# Patient Record
Sex: FEMALE | Race: BLACK OR AFRICAN AMERICAN
[De-identification: names, ages, dates, MRNs, and addresses within clinical notes are randomized per-mention and may not be internally consistent; named-entity substitution may affect disease eponyms.]

---

## 2019-10-22 ENCOUNTER — HOSPITAL ENCOUNTER (EMERGENCY)
Dept: HOSPITAL 72 - EMR | Age: 34
Discharge: HOME | End: 2019-10-22
Payer: COMMERCIAL

## 2019-10-22 VITALS — SYSTOLIC BLOOD PRESSURE: 117 MMHG | DIASTOLIC BLOOD PRESSURE: 72 MMHG

## 2019-10-22 VITALS — WEIGHT: 155 LBS | HEIGHT: 67 IN | BODY MASS INDEX: 24.33 KG/M2

## 2019-10-22 DIAGNOSIS — E07.9: ICD-10-CM

## 2019-10-22 DIAGNOSIS — L03.113: Primary | ICD-10-CM

## 2019-10-22 PROCEDURE — 99282 EMERGENCY DEPT VISIT SF MDM: CPT

## 2019-10-22 NOTE — NUR
ED Nurse Note:



Pt walked in due to right arm pain and redness x 1 day. Also noted redness on 
right foot. No SOB. VSS.

## 2019-10-22 NOTE — NUR
ED Nurse Note:



Pt cleared by ERMD for discharge.  DC instructions/prescription was given and 
explained to pt and verbalized understanding of teachings. All medical deviecs 
such as ID band  removed. Pt is AAO x4, ambulatory and left with all personal 
belongings.

## 2019-11-17 ENCOUNTER — HOSPITAL ENCOUNTER (EMERGENCY)
Dept: HOSPITAL 72 - EMR | Age: 34
Discharge: ANOTHER HOSPITAL | End: 2019-11-17
Payer: MEDICAID

## 2019-11-17 VITALS — DIASTOLIC BLOOD PRESSURE: 72 MMHG | SYSTOLIC BLOOD PRESSURE: 116 MMHG

## 2019-11-17 VITALS — DIASTOLIC BLOOD PRESSURE: 68 MMHG | SYSTOLIC BLOOD PRESSURE: 137 MMHG

## 2019-11-17 VITALS — SYSTOLIC BLOOD PRESSURE: 140 MMHG | DIASTOLIC BLOOD PRESSURE: 78 MMHG

## 2019-11-17 VITALS — DIASTOLIC BLOOD PRESSURE: 66 MMHG | SYSTOLIC BLOOD PRESSURE: 118 MMHG

## 2019-11-17 VITALS — DIASTOLIC BLOOD PRESSURE: 68 MMHG | SYSTOLIC BLOOD PRESSURE: 126 MMHG

## 2019-11-17 VITALS — HEIGHT: 67 IN | BODY MASS INDEX: 29.19 KG/M2 | WEIGHT: 186 LBS

## 2019-11-17 VITALS — SYSTOLIC BLOOD PRESSURE: 116 MMHG | DIASTOLIC BLOOD PRESSURE: 68 MMHG

## 2019-11-17 DIAGNOSIS — R10.11: Primary | ICD-10-CM

## 2019-11-17 DIAGNOSIS — K80.20: ICD-10-CM

## 2019-11-17 DIAGNOSIS — Z97.5: ICD-10-CM

## 2019-11-17 DIAGNOSIS — N30.00: ICD-10-CM

## 2019-11-17 LAB
ADD MANUAL DIFF: NO
ALBUMIN SERPL-MCNC: 4.2 G/DL (ref 3.4–5)
ALBUMIN/GLOB SERPL: 1.1 {RATIO} (ref 1–2.7)
ALP SERPL-CCNC: 113 U/L (ref 46–116)
ALT SERPL-CCNC: 69 U/L (ref 12–78)
ANION GAP SERPL CALC-SCNC: 14 MMOL/L (ref 5–15)
APPEARANCE UR: (no result)
APTT BLD: 26 SEC (ref 23–33)
APTT PPP: (no result) S
AST SERPL-CCNC: 100 U/L (ref 15–37)
BASOPHILS NFR BLD AUTO: 0.5 % (ref 0–2)
BILIRUB SERPL-MCNC: 0.7 MG/DL (ref 0.2–1)
BUN SERPL-MCNC: 5 MG/DL (ref 7–18)
CALCIUM SERPL-MCNC: 9.1 MG/DL (ref 8.5–10.1)
CHLORIDE SERPL-SCNC: 106 MMOL/L (ref 98–107)
CO2 SERPL-SCNC: 23 MMOL/L (ref 21–32)
CREAT SERPL-MCNC: 0.8 MG/DL (ref 0.55–1.3)
EOSINOPHIL NFR BLD AUTO: 0.7 % (ref 0–3)
ERYTHROCYTE [DISTWIDTH] IN BLOOD BY AUTOMATED COUNT: 10.2 % (ref 11.6–14.8)
GLOBULIN SER-MCNC: 3.9 G/DL
GLUCOSE UR STRIP-MCNC: (no result) MG/DL
HCT VFR BLD CALC: 45.7 % (ref 37–47)
HGB BLD-MCNC: 15.4 G/DL (ref 12–16)
INR PPP: 0.9 (ref 0.9–1.1)
KETONES UR QL STRIP: (no result)
LEUKOCYTE ESTERASE UR QL STRIP: (no result)
LYMPHOCYTES NFR BLD AUTO: 23.8 % (ref 20–45)
MCV RBC AUTO: 84 FL (ref 80–99)
MONOCYTES NFR BLD AUTO: 8.3 % (ref 1–10)
NEUTROPHILS NFR BLD AUTO: 66.8 % (ref 45–75)
NITRITE UR QL STRIP: NEGATIVE
PH UR STRIP: 8 [PH] (ref 4.5–8)
PLATELET # BLD: 380 K/UL (ref 150–450)
POTASSIUM SERPL-SCNC: 3.4 MMOL/L (ref 3.5–5.1)
PROT UR QL STRIP: NEGATIVE
RBC # BLD AUTO: 5.41 M/UL (ref 4.2–5.4)
SODIUM SERPL-SCNC: 143 MMOL/L (ref 136–145)
SP GR UR STRIP: 1.01 (ref 1–1.03)
UROBILINOGEN UR-MCNC: NORMAL MG/DL (ref 0–1)
WBC # BLD AUTO: 14.1 K/UL (ref 4.8–10.8)

## 2019-11-17 PROCEDURE — 96367 TX/PROPH/DG ADDL SEQ IV INF: CPT

## 2019-11-17 PROCEDURE — 84703 CHORIONIC GONADOTROPIN ASSAY: CPT

## 2019-11-17 PROCEDURE — 83690 ASSAY OF LIPASE: CPT

## 2019-11-17 PROCEDURE — 74177 CT ABD & PELVIS W/CONTRAST: CPT

## 2019-11-17 PROCEDURE — 81003 URINALYSIS AUTO W/O SCOPE: CPT

## 2019-11-17 PROCEDURE — 99285 EMERGENCY DEPT VISIT HI MDM: CPT

## 2019-11-17 PROCEDURE — 80307 DRUG TEST PRSMV CHEM ANLYZR: CPT

## 2019-11-17 PROCEDURE — 96376 TX/PRO/DX INJ SAME DRUG ADON: CPT

## 2019-11-17 PROCEDURE — 80053 COMPREHEN METABOLIC PANEL: CPT

## 2019-11-17 PROCEDURE — 96365 THER/PROPH/DIAG IV INF INIT: CPT

## 2019-11-17 PROCEDURE — 86901 BLOOD TYPING SEROLOGIC RH(D): CPT

## 2019-11-17 PROCEDURE — 85610 PROTHROMBIN TIME: CPT

## 2019-11-17 PROCEDURE — 76700 US EXAM ABDOM COMPLETE: CPT

## 2019-11-17 PROCEDURE — 96375 TX/PRO/DX INJ NEW DRUG ADDON: CPT

## 2019-11-17 PROCEDURE — 93005 ELECTROCARDIOGRAM TRACING: CPT

## 2019-11-17 PROCEDURE — 85730 THROMBOPLASTIN TIME PARTIAL: CPT

## 2019-11-17 PROCEDURE — 85025 COMPLETE CBC W/AUTO DIFF WBC: CPT

## 2019-11-17 PROCEDURE — 36415 COLL VENOUS BLD VENIPUNCTURE: CPT

## 2019-11-17 PROCEDURE — 86850 RBC ANTIBODY SCREEN: CPT

## 2019-11-17 PROCEDURE — 96361 HYDRATE IV INFUSION ADD-ON: CPT

## 2019-11-17 PROCEDURE — 86900 BLOOD TYPING SEROLOGIC ABO: CPT

## 2019-11-17 NOTE — DIAGNOSTIC IMAGING REPORT
Indication: Abdominal pain, abnormal liver function tests

 

Technique: Gray-scale and duplex images of the upper abdomen were obtained

 

Comparison: CT scan of earlier the same day

 

Findings: Gallbladder demonstrates gallstones. No gallbladder wall thickening nor

pericholecystic fluid.  However, sonographic Monaco's sign is positive.  Common bile

duct measures 2 mm in diameter.  No intrahepatic biliary ductal dilatation.  Liver

demonstrates normal echogenicity, no focal abnormality.   Portal vein and hepatic

veins are patent.   Pancreas is unremarkable.    Spleen is unremarkable.  Left kidney

measures 12.5 cm in length.  Right kidney measures 12.5 cm length.  Both kidneys

demonstrate normal echogenicity.  There is no hydronephrosis.   No focal abnormality

. Non-aneurysmal abdominal aorta .

 

Impression: Cholelithiasis. Positive sonographic Monaco's sign raises concern for

acute cholecystitis. Correlate with clinical findings, consider hepatobiliary nuclear

scan if there is high clinical suspicion

 

Negative for dilated bile ducts

 

No other significant abnormality

 

This agrees with the preliminary interpretation provided overnight by [facility]

teleradiology service.

## 2019-11-17 NOTE — EMERGENCY ROOM REPORT
History of Present Illness


General


Chief Complaint:  Abdominal Pain


Source:  Patient


 (Lew Garcia MD)





Present Illness


HPI


Patient presents with severe abdominal pain.  She feels it diffusely in her 

abdomen.  Started yesterday.  She has had problems with gallbladder pain in the 

past but this feels different.  She has been vomiting uncontrollably also.  

There is no coffee grounds.  When she vomits it tastes bitter like bile she had 

a normal bowel movement earlier today.  No blood, melena or constipation.  She 

denies any dysuria at this time.  She is broken out in a sweat.





In July she was evaluated for abdominal pain.  Abdominal CT was performed which 

revealed gallstones.  Her white count was normal at that time.  This is the 

medical decision making:


Patient presents with right-sided abdominal pain.  This probably secondary to 

cholelithiasis and biliary colic.  No evidence of cholecystitis or obstruction.

  Pain is well controlled now.  Her elevated liver enzymes probably secondary 

to inflammatory process that has now resolved.  No evidence of any obstruction.

  Patient does have a urinary tract infection.  Antibiotics given here.  No 

evidence of an acute abdomen.  Will discharge home.





No fevers, chills, sore throat, chest pain, palpitations, shortness of breath, 

joint pain, rashes, depression, anxiety, visual changes, dizziness, headache.





She was evaluated here for a goiter.  She was deemed to be euthyroid.


 (Lew Garcia MD)


Allergies:  


Coded Allergies:  


     No Known Allergies (Unverified , 5/17/15)





Patient History


Past Medical History:  see triage record


Social History:  Reports: drug use - [facility]


Social History Narrative


With significant other


Last Menstrual Period:  10/2019


Pregnant Now:  No


Reviewed Nursing Documentation:  PMH: Agreed; PSxH: Agreed (Lew Garcia MD)





Nursing Documentation-PMH


Past Medical History:  No History, Except For


 (Lew Garcia MD)





Physical Exam





Vital Signs








  Date Time  Temp Pulse Resp B/P (MAP) Pulse Ox O2 Delivery O2 Flow Rate FiO2


 


11/17/19 11:45 98.2 55 20 129/72 (91) 98 Room Air  








Sp02 EP Interpretation:  reviewed, normal


General Appearance:  well appearing, GCS 15, non-toxic, moderate distress


Head:  normocephalic, atraumatic


Eyes:  bilateral eye normal inspection, bilateral eye PERRL, bilateral eye EOMI


ENT:  moist mucus membranes


Neck:  full range of motion, thyromegaly


Respiratory:  lungs clear, normal breath sounds


Cardiovascular #1:  regular rate, rhythm


Cardiovascular #2:  2+ radial (R)


Gastrointestinal:  normal inspection, normal bowel sounds, no mass, non-

distended, no rebound, guarding - diffuse, tenderness


Genitourinary:  no CVA tenderness


Musculoskeletal:  back normal, normal range of motion


Neurologic:  alert, oriented x3, grossly normal


Psychiatric:  anxious - and in pain


Skin:  diaphoresis, warm/dry


 (Lew Garcia MD)





Medical Decision Making


Diagnostic Impression:  


 Primary Impression:  


 Abdominal pain


 Qualified Codes:  R10.11 - Right upper quadrant pain


 Additional Impressions:  


 UTI (urinary tract infection)


 Qualified Codes:  N30.00 - Acute cystitis without hematuria


 Symptomatic cholelithiasis


ER Course


The patient presents with severe abdominal pain that began last night.  

Differential includes gastritis, gastroenteritis, diverticulitis, pancreatitis, 

appendicitis, urinary tract infection, pyelonephritis amongst others.  The 

patient is an extremis and pain at this time.  Evaluation will be with labs and 

CT of the abdomen, EKG.  Patient will be treated with IV hydration and 

analgesia and anti-medics with some Pepcid.





After first dose of morphine pain still significant.  Dilaudid given.





Improvement after Dilaudid able to rest.





Initial EKG suggests atrial fibrillation however I read more atrial bigeminy.  

Labs with elevated white count.  Elevated AST.  Slightly low potassium.





EKG repeated.  Normal sinus rhythm with nonspecific ST-T wave changes.





Ultrasound ordered.





Flagyl added.  Concern for cholecystitis.





Pain returns.  Also nausea.  Zofran given and repeat Dilaudid.  CT pending.  

Patient signed out to Dr. Vasquez





Laboratory Tests








Test


  11/17/19


11:55 11/17/19


15:10


 


White Blood Count


  14.1 K/UL


(4.8-10.8)  H 


 


 


Red Blood Count


  5.41 M/UL


(4.20-5.40)  H 


 


 


Hemoglobin


  15.4 G/DL


(12.0-16.0) 


 


 


Hematocrit


  45.7 %


(37.0-47.0) 


 


 


Mean Corpuscular Volume 84 FL (80-99)   


 


Mean Corpuscular Hemoglobin


  28.5 PG


(27.0-31.0) 


 


 


Mean Corpuscular Hemoglobin


Concent 33.8 G/DL


(32.0-36.0) 


 


 


Red Cell Distribution Width


  10.2 %


(11.6-14.8)  L 


 


 


Platelet Count


  380 K/UL


(150-450) 


 


 


Mean Platelet Volume


  5.3 FL


(6.5-10.1)  L 


 


 


Neutrophils (%) (Auto)


  66.8 %


(45.0-75.0) 


 


 


Lymphocytes (%) (Auto)


  23.8 %


(20.0-45.0) 


 


 


Monocytes (%) (Auto)


  8.3 %


(1.0-10.0) 


 


 


Eosinophils (%) (Auto)


  0.7 %


(0.0-3.0) 


 


 


Basophils (%) (Auto)


  0.5 %


(0.0-2.0) 


 


 


Prothrombin Time


  10.1 SEC


(9.30-11.50) 


 


 


Prothrombin Time INR 0.9 (0.9-1.1)   


 


PTT


  26 SEC (23-33)


  


 


 


Sodium Level


  143 MMOL/L


(136-145) 


 


 


Potassium Level


  3.4 MMOL/L


(3.5-5.1)  L 


 


 


Chloride Level


  106 MMOL/L


() 


 


 


Carbon Dioxide Level


  23 MMOL/L


(21-32) 


 


 


Anion Gap


  14 mmol/L


(5-15) 


 


 


Blood Urea Nitrogen


  5 mg/dL (7-18)


L 


 


 


Creatinine


  0.8 MG/DL


(0.55-1.30) 


 


 


Estimate Glomerular


Filtration Rate > 60 mL/min


(>60) 


 


 


Glucose Level


  146 MG/DL


()  H 


 


 


Calcium Level


  9.1 MG/DL


(8.5-10.1) 


 


 


Total Bilirubin


  0.7 MG/DL


(0.2-1.0) 


 


 


Aspartate Amino Transferase


(AST) 100 U/L


(15-37)  H 


 


 


Alanine Aminotransferase (ALT)


  69 U/L (12-78)


  


 


 


Alkaline Phosphatase


  113 U/L


() 


 


 


Total Protein


  8.1 G/DL


(6.4-8.2) 


 


 


Albumin


  4.2 G/DL


(3.4-5.0) 


 


 


Globulin 3.9 g/dL   


 


Albumin/Globulin Ratio 1.1 (1.0-2.7)   


 


Lipase


  141 U/L


() 


 


 


Human Chorionic Gonadotropin,


Qual Negative


(NEGATIVE) 


 


 


Serum Alcohol < 3 mg/dL   


 


Urine Color  Pale yellow  


 


Urine Appearance  Cloudy  


 


Urine pH  8 (4.5-8.0)  


 


Urine Specific Gravity


  


  1.015


(1.005-1.035)


 


Urine Protein


  


  Negative


(NEGATIVE)


 


Urine Glucose (UA)


  


  1+ (NEGATIVE)


H


 


Urine Ketones


  


  3+ (NEGATIVE)


H


 


Urine Blood


  


  2+ (NEGATIVE)


H


 


Urine Nitrite


  


  Negative


(NEGATIVE)


 


Urine Bilirubin


  


  Negative


(NEGATIVE)


 


Urine Urobilinogen


  


  Normal MG/DL


(0.0-1.0)


 


Urine Leukocyte Esterase


  


  1+ (NEGATIVE)


H


 


Urine RBC


  


  2-4 /HPF (0 -


2)  H


 


Urine WBC


  


  5-10 /HPF (0 -


2)  H


 


Urine Squamous Epithelial


Cells 


  Moderate /LPF


(NONE/OCC)  H


 


Urine Amorphous Sediment


  


  Moderate /LPF


(NONE)  H


 


Urine Bacteria


  


  Few /HPF


(NONE)


 


Urine Opiates Screen  Pending  


 


Urine Barbiturates Screen  Pending  


 


Phencyclidine (PCP) Screen  Pending  


 


Urine Amphetamines Screen  Pending  


 


Urine Benzodiazepines Screen  Pending  


 


Urine Cocaine Screen  Pending  


 


Urine Marijuana (THC) Screen  Pending  








 (Lew Garcia MD)





EKG Diagnostic Results


Rate:  other


Rhythm:  other - Indeterminate rhythm possible atrial fibrillation versus 

atrial bigeminy


ST Segments:  no acute changes


 (Lew Garcia MD)





Rhythm Strip Diag. Results


EP Interpretation:  yes


Rhythm:  NSR, no PVC's, other - PACs


 (Lew Garcia MD)





CT/MRI/US Diagnostic Results


CT/MRI/US Diagnostic Results :  


   Impression


Preliminary Findings Only  See Final Report For Complete Findings


CT ABDOMEN & PELVIS With Contrast:





Comparison is made to CT abdomen/pelvis on 7/25/2019.





Cholelithiasis.





Small splenule.





Excreting contrast in the renal collecting systems. No hydronephrosis.





Normal appendix.





Prominence of the walls of the colon may be secondary to under distention 

versus colitis.





No small bowel obstruction.





Intrauterine device in appropriate position.





Mild prominence of the bladder wall may be secondary to underdistention. Please 

correlate with urinalysis if concerned for cystitis.





Collapsing cyst or corpus luteum in the left ovary.








Radiologist:   Stephen Lucero M.D.   Phone:   [phone]


Study ready at 15:25 and initial results transmitted at 16:26


*This report constitutes a preliminary interpretation only. Non-acute findings 

felt to be unrelated to the clinical presentation may not be discussed in this 

report. The study will be interpreted and a final report will be generated by 

the local Radiologist the following shift. To reach the hospital radiology 

department call [phone].


 (Gaurav Vasquez MD)





Last Vital Signs








  Date Time  Temp Pulse Resp B/P (MAP) Pulse Ox O2 Delivery O2 Flow Rate FiO2


 


11/17/19 18:30 98.2       


 


11/17/19 16:30  72 16 116/72 98 Room Air  








Status:  improved


 (Lew Garcia MD)


Reevaluation Time:  17:21


Reevaluation Impression


Assumed care of the patient from Dr. Garcia approximately 1630





Briefly, this a 34-year-old female presenting for evaluation of severe 

abdominal pain and vomiting.  She was found to have a small increase in her 

white blood cell count and an increased in her AST.  At the time of signout a 

CT scan was pending.  This is now been resulted showing cholelithiasis, normal 

appendix, no evidence of obstruction and no hydronephrosis.  An ultrasound of 

the right upper quadrant was obtained showing significant stone burden in the 

gallbladder and at the neck of the gallbladder but no pericholecystic fluid, no 

CBD dilation, no signs of acute cholecystitis, choledocholithiasis or 

cholangitis.  The patient is required multiple rounds of antiemetics and pain 

medications.  Discussed with Dr. Cisneros who has accepted the patient for 

transfer to [facility] per her insurance plan.  She is stable 

for transfer.


 (Gaurav Vasquez MD)


Disposition:  XFER SHT-TRM HOSP


Condition:  Serious


Referrals:  


NON PHYSICIAN (PCP)











Lew Garcia MD Nov 17, 2019 15:52


Gaurav Vasquez MD Nov 17, 2019 17:29

## 2019-11-17 NOTE — DIAGNOSTIC IMAGING REPORT
Clinical Indication: Abdominal pain with nausea and vomiting

 

Technique:   Patient given oral contrast.  IV administration nonionic contrast.

Venous phase spiral acquisition obtained through the abdomen and pelvis. Multiplanar

reconstructions were generated. Total dose length product 926 mGycm. CTDIvol(s) 15 

mGy. Dose reduction achieved using automated exposure control

 

 

Comparison: 7/25/2019 noncontrast study

 

Findings: The appendix is normal. No evidence of colonic diverticulosis or

diverticulitis. Contrast traverses entirety of the small bowel, reaches the cecum. No

small bowel distention or small bowel wall thickening. The distal esophagus, stomach,

duodenum are unremarkable.

 

The gallbladder contains gallstones. The liver, pancreas, spleen, adrenals, kidneys

are all unremarkable. No renal or ureteral calculi, hydronephrosis, nor hydroureter.

No pelvic mass or adenopathy. Uterus again demonstrates an intrauterine device.

 

The included lung bases are clear. The bones are unremarkable.

 

Impression: No acute abnormality

 

Cholelithiasis

 

Intrauterine device

 

This essentially agrees with the preliminary interpretation provided overnight by

[facility] teleradiology service.

 

 

 

The CT scanner at [facility] is accredited by the American College of

Radiology and the scans are performed using protocols designed to limit radiation

exposure to as low as reasonably achievable to attain images of sufficient resolution

adequate for diagnostic evaluation.

## 2019-11-20 NOTE — CARDIOLOGY REPORT
--------------- APPROVED REPORT --------------





EKG Measurement

Heart [id number]

[id number]

[id number]

[id number]





Atrial fibrillation with premature ventricular or aberrantly conducted complexes

T wave abnormality, consider inferior ischemia

Abnormal ECG

## 2019-11-20 NOTE — CARDIOLOGY REPORT
--------------- APPROVED REPORT --------------





EKG Measurement

Heart [id number]

NM [id number]

[id number]

[id number]

[id number]





Normal sinus rhythm

Abnormal QRS-T angle, consider primary T wave abnormality

Abnormal ECG

## 2020-10-21 NOTE — EMERGENCY ROOM REPORT
History of Present Illness


General


Chief Complaint:  Skin Rash/Abscess





Present Illness


HPI


36 YO female presents to the ED c/o 6/10 in severity pain, tenderness, erythema,

warmth and swelling to three itchy insect bites on the left arm that has been 

progressive x 4 days.  Pt. denies fevers, chills or swollen tender lymph nodes. 

Denies lesions/rashes elsewhere on the body. Denies new medications or body 

washes or creams. Denies swelling of the lips, tongue , throat or airway. Denies

wheezing, or shortness of breath.  Denies recent travel, recent illness or ill 

contacts.  denies blisters, oral lesions, or sloughing of the skin


Allergies:  


Coded Allergies:  


     No Known Allergies (Unverified , 5/17/15)





COVID-19 Screening


Contact w/high risk pt:  No


Experienced COVID-19 symptoms?:  No


COVID-19 Testing performed PTA:  No





Patient History


Past Medical History:  see triage record


Past Surgical History:  none


Pertinent Family History:  none


Last Menstrual Period:  last week


Pregnant Now:  No


Reviewed Nursing Documentation:  PMH: Agreed; PSxH: Agreed





Review of Systems


All Other Systems:  negative except mentioned in HPI





Physical Exam





Vital Signs








  Date Time  Temp Pulse Resp B/P (MAP) Pulse Ox O2 Delivery O2 Flow Rate FiO2


 


10/21/20 14:51 98.2 76 16 127/76 (93) 97 Room Air  








Sp02 EP Interpretation:  reviewed, normal


General Appearance:  no apparent distress, alert, GCS 15, non-toxic


Head:  normocephalic, atraumatic


Eyes:  bilateral eye normal inspection, bilateral eye PERRL


ENT:  hearing grossly normal, normal voice, other - no stridor, no swelling of 

the lips or tongue


Neck:  full range of motion


Respiratory:  chest non-tender, lungs clear, normal breath sounds, no 

respiratory distress, no accessory muscle use, no wheezing, speaking full 

sentences


Cardiovascular #1:  regular rate, rhythm, normal capillary refill


Cardiovascular #2:  2+ radial (L)


Musculoskeletal:  normal range of motion, gait/station normal, non-tender


Neurologic:  alert, motor strength/tone normal, oriented x3, sensory intact, 

responsive, speech normal


Psychiatric:  judgement/insight normal


Skin:  other - Three discrete areas of swelling, erythema and warmth with 

excoriations noted. on the left UE, no blisters or vesicles


Lymphatic:  no adenopathy





Medical Decision Making


PA Attestation


Dr. Thorne Is my supervising Physician whom patient management has been 

discussed with.


Diagnostic Impression:  


   Primary Impression:  


   Insect bites of multiple sites, infected


ER Course


36 YO female presents to the ED c/o 6/10 in severity pain, tenderness, erythema,

warmth and swelling to three itchy insect bites on the left arm that has been 

progressive x 4 days.  Pt. denies fevers, chills or swollen tender lymph nodes. 

Denies lesions/rashes elsewhere on the body. Denies new medications or body 

washes or creams. Denies swelling of the lips, tongue , throat or airway. Denies

wheezing, or shortness of breath.  Denies recent travel, recent illness or ill 

contacts.  denies blisters, oral lesions, or sloughing of the skin





Ddx considered but are not limited to cellulitis, scabies, insect bites, tic 

bites, spider bites, contact dermatitis, Drug reaction, allergic reaction, 

fungal infection, lice. 





Vital signs: are WNL, pt. is afebrile


H&PE are most consistent with localized reaction to insect bites of the left UE 

with secondary cellulitis. 





ORDERS: none required at this time, the diagnosis is clinical





ED INTERVENTIONS: None required at this time. 





DISCHARGE: At this time pt. is stable for d/c to home. Will provide printed 

patient care instructions, and any necessary prescriptions. Care plan and follow

up instructions have been discussed with the patient prior to discharge.





Last Vital Signs








  Date Time  Temp Pulse Resp B/P (MAP) Pulse Ox O2 Delivery O2 Flow Rate FiO2


 


10/21/20 14:51 98.2 76 16 127/76 (93) 97 Room Air  








Disposition:  HOME, SELF-CARE


Condition:  Stable


Referrals:  


[facility]





[facility]


Departure Forms:  Return to Work      Return to Work Date:  Oct 23, 2020


   Other Restrictions:  May return Sooner if Symptoms have resolved. 


   Return to Full Activity:  Oct 23, 2020


   Work Restrictions:  No Heavy Lifting


Patient Instructions:  Cellulitis, Easy-to-Read, Insect Bite





Additional Instructions:  


Take medications as directed.  ** Do not drink alcohol, drive, or operate heavy 

machinery while taking Benadryl as this may cause drowsiness. 





 ** Follow up with a Primary Care Provider in 3-5 days, even if your symptoms 

have resolved. ** 


--Please review list of primary care clinics, if you do not already have a 

primary care provider





Return sooner to ED if new symptoms occur, or current symptoms become worse. 


Do not drink alcohol, drive, or operate heavy machinery while taking Benadryl as

this may cause drowsiness. 











- Please note that this Emergency Department Report was dictated using [employer] technology software, occasionally this can lead to 

erroneous entry secondary to interpretation by the dictation equipment.











Brittni Reveles              Oct 21, 2020 16:16

## 2020-10-21 NOTE — NUR
ER DISCHARGE NOTE:



Patient is cleared to be discharged per ERPA, pt is aox4, on room air, with 
stable vital signs. pt was given dc and prescription instructions, pt was able 
to verbalize understanding, pt id band removed pt is able to ambulate with 
steady gait. pt took all belongings.

## 2020-10-21 NOTE — NUR
ED Nurse Note:



Pt ambulated to ED from home d/t redness and possible insect bites on LT upper 
arm. Pt is AOx4, calm and cooperative to care. Per pt, she works at a warehouse 
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 Placed on chair.

## 2021-01-23 NOTE — EMERGENCY ROOM REPORT
History of Present Illness


General


Chief Complaint:  Pain





Present Illness


HPI


35-year-old female with no significant past medical history here complaining of 

2 days of epigastric abdominal pain and both sided rib pain.  Complains of 

little bit of shortness of breath.  Reports that she tested positive for Covid 

in December, however tested - January 16.  Denies any cough or congestion, 

diarrhea, nausea or vomiting.  Denies change of pain with change of position.  

Has not taken medication for symptom relief.  Denies any fall or injury, lifting

heavy objects.  Denies any flatulence at this time.  Denies palpitation.  Denies

headache and dizziness.  Reports that last menstrual period was January 4.  

Patient reports that she has an IUD.  Denies alcohol intake, drug use, tobacco 

smoke


Allergies:  


Coded Allergies:  


     No Known Allergies (Unverified , 5/17/15)





COVID-19 Screening


Contact w/high risk pt:  No


Experienced COVID-19 symptoms?:  No


COVID-19 Testing performed PTA:  Yes


COVID-19 Screening:  Negative COVID-19


COVID-19 Testing Source:  1/16/2021





Patient History


Past Medical History:  see triage record


Past Surgical History:  none


Pertinent Family History:  none


Last Menstrual Period:  n/a


Immunizations:  UTD


Reviewed Nursing Documentation:  PMH: Agreed; PSxH: Agreed





Review of Systems


All Other Systems:  negative except mentioned in HPI





Physical Exam





Vital Signs








  Date Time  Temp Pulse Resp B/P (MAP) Pulse Ox O2 Delivery O2 Flow Rate FiO2


 


1/23/21 20:18 98.2 86  118/68 (85) 100 Room Air  








Sp02 EP Interpretation:  reviewed, normal


General Appearance:  no apparent distress, alert, GCS 15, non-toxic


Head:  normocephalic, atraumatic


Eyes:  bilateral eye normal inspection, bilateral eye PERRL


ENT:  hearing grossly normal, normal pharynx, no angioedema, normal voice


Neck:  full range of motion, supple, thyroid normal, no meningismus, no bony 

tend, supple/symm/no masses


Respiratory:  chest non-tender, lungs clear, normal breath sounds, no rhonchi, 

no respiratory distress, no retraction, speaking full sentences


Cardiovascular #1:  regular rate, rhythm, no edema, no murmur


Gastrointestinal:  normal bowel sounds, non tender, soft, no mass, no 

organomegaly, no peritonitis, no bruit, non-distended, no guarding, no hernia, 

no pulsatile mass, no rebound


Rectal:  deferred


Genitourinary:  no CVA tenderness


Musculoskeletal:  back normal


Neurologic:  alert, motor strength/tone normal, oriented x3, sensory intact, 

responsive, speech normal


Psychiatric:  judgement/insight normal, memory normal, mood/affect normal, no 

suicidal/homicidal ideation


Skin:  no rash


Lymphatic:  no adenopathy





Medical Decision Making


PA Attestation


All my diagnosis and treatment plans were reviewed ad discussed with my 

supervising physician Dr. Fernandez


Diagnostic Impression:  


   Primary Impression:  


   Chest pain


   Additional Impression:  


   UTI (urinary tract infection)


ER Course


35-year-old female with no significant past medical history here complaining of 

2 days of epigastric abdominal pain and both sided rib pain.  Complains of 

little bit of shortness of breath.  Reports that she tested positive for Covid 

in December, however tested - January 16.  Denies any cough or congestion, 

diarrhea, nausea or vomiting.  Denies change of pain with change of position.  

Has not taken medication for symptom relief.  Denies any fall or injury, lifting

heavy objects.  Denies any flatulence at this time.  Denies palpitation.  Denies

headache and dizziness.  Reports that last menstrual period was January 4.  

Patient reports that she has an IUD.  Denies alcohol intake, drug use, tobacco 

smoke





Ddx considered but are not limited to: MI, Angina, COPD, GERD, gastritis 

pancreatitis, pneumonia,














Vital signs: are WNL, pt. is afebrile








 H&PE are most consistent with 














ORDERS: EKG, Chest XR, cardiac labs














ED INTERVENTIONS: NS bolus, Toradol, Zofran, Pepcid














I signed on the patient to Dr. Fernandez and 9 PM


EKG Diagnostic Results


Rate:  normal


Rhythm:  NSR


ST Segments:  no acute changes


Other Impression


No acute ST changes


ASA given to the pt in ED:  No





Chest X-Ray Diagnostic Results


Chest X-Ray Diagnostic Results :  


   Chest X-Ray Ordered:  Yes


   # of Views/Limited/Complete:  1 View


   Indication:  Chest Pain


   EP Interpretation:  Yes


   PA Xray:  Interpretation reviewed, by supervising MD, and agrees with 

findings.


   Interpretation:  no consolidation, no effusion, no pneumothorax


   Impression:  No acute disease


   Electronically Signed by:  Jovanni GARSIA [employer]


TECHNIQUE:


Frontal view of the chest.





COMPARISON:


No relevant prior studies available.





FINDINGS:


Lungs: Unremarkable. No consolidation.


Pleural space: Unremarkable. No pneumothorax.


Heart: Unremarkable. No cardiomegaly.


Mediastinum: Unremarkable.


Bones/joints: Mild mid thoracic scoliosis convexed to the right





IMPRESSION:


No acute findings in the chest.





Last Vital Signs








  Date Time  Temp Pulse Resp B/P (MAP) Pulse Ox O2 Delivery O2 Flow Rate FiO2


 


1/23/21 20:18 98.2 86  118/68 (85) 100 Room Air  

















Jovanni Vallejo      Jan 23, 2021 20:41

## 2021-01-23 NOTE — NUR
ED Nurse Note: Pt walked in from home. walks with a steady gait, axox4, vitals 
stable on RA as documented, speaks in full sentances, breathing is even and 
unlabored, no signs of distress noted. Pt states that 3 days a go she started 
to expereince intermittend chest pain when she was lying down or at rest. she 
states that it feels sore around the bottom of her rib cage bilateraly. She 
states no difficulty breathing or feeling short of breath.  labs draw, ekg done 
at bedside, urine sent to lab.

## 2021-01-23 NOTE — DIAGNOSTIC IMAGING REPORT
EXAM:

  XR Chest, 1 View

 

CLINICAL HISTORY:

  PAIN

 

TECHNIQUE:

  Frontal view of the chest.

 

COMPARISON:

  No relevant prior studies available.

 

FINDINGS:

  Lungs:  Unremarkable.  No consolidation.

  Pleural space:  Unremarkable.  No pneumothorax.

  Heart:  Unremarkable.  No cardiomegaly.

  Mediastinum:  Unremarkable.

  Bones/joints:  Mild mid thoracic scoliosis convexed to the right

 

IMPRESSION:     

  No acute findings in the chest.

## 2021-01-23 NOTE — NUR
ER DISCHARGE NOTE:

Patient is cleared to be discharged per ERMD, pt is aox4, on room air, with 
stable vital signs. pt was given dc and prescription instructions, pt was able 
to verbalize understanding, pt id band and iv site removed without 
complications. pt is able to ambulate with steady gait. pt took all belongings. 
Pt left in a private car